# Patient Record
Sex: FEMALE | Race: OTHER
[De-identification: names, ages, dates, MRNs, and addresses within clinical notes are randomized per-mention and may not be internally consistent; named-entity substitution may affect disease eponyms.]

---

## 2018-07-10 ENCOUNTER — HOSPITAL ENCOUNTER (EMERGENCY)
Dept: HOSPITAL 62 - ER | Age: 28
Discharge: HOME | End: 2018-07-10
Payer: COMMERCIAL

## 2018-07-10 VITALS — DIASTOLIC BLOOD PRESSURE: 57 MMHG | SYSTOLIC BLOOD PRESSURE: 126 MMHG

## 2018-07-10 DIAGNOSIS — S16.1XXA: Primary | ICD-10-CM

## 2018-07-10 DIAGNOSIS — M54.89: ICD-10-CM

## 2018-07-10 DIAGNOSIS — S29.012A: ICD-10-CM

## 2018-07-10 DIAGNOSIS — M54.2: ICD-10-CM
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
## 2018-07-10 PROCEDURE — 99284 EMERGENCY DEPT VISIT MOD MDM: CPT

## 2018-07-10 PROCEDURE — 72125 CT NECK SPINE W/O DYE: CPT

## 2018-07-10 PROCEDURE — 72070 X-RAY EXAM THORAC SPINE 2VWS: CPT
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
## 2018-08-21 ENCOUNTER — HOSPITAL ENCOUNTER (EMERGENCY)
Dept: HOSPITAL 62 - ER | Age: 28
LOS: 1 days | Discharge: HOME | End: 2018-08-22
Payer: COMMERCIAL

## 2018-08-21 DIAGNOSIS — Z3A.01: ICD-10-CM

## 2018-08-21 DIAGNOSIS — O21.9: ICD-10-CM

## 2018-08-21 DIAGNOSIS — O23.41: Primary | ICD-10-CM

## 2018-08-21 DIAGNOSIS — Z87.891: ICD-10-CM

## 2018-08-21 LAB
ADD MANUAL DIFF: NO
ALBUMIN SERPL-MCNC: 4.6 G/DL (ref 3.5–5)
ALP SERPL-CCNC: 87 U/L (ref 38–126)
ALT SERPL-CCNC: 24 U/L (ref 9–52)
ANION GAP SERPL CALC-SCNC: 15 MMOL/L (ref 5–19)
APPEARANCE UR: (no result)
APTT PPP: (no result) S
AST SERPL-CCNC: 23 U/L (ref 14–36)
BASOPHILS # BLD AUTO: 0 10^3/UL (ref 0–0.2)
BASOPHILS NFR BLD AUTO: 0.2 % (ref 0–2)
BILIRUB DIRECT SERPL-MCNC: 0.4 MG/DL (ref 0–0.4)
BILIRUB SERPL-MCNC: 0.8 MG/DL (ref 0.2–1.3)
BILIRUB UR QL STRIP: NEGATIVE
BUN SERPL-MCNC: 12 MG/DL (ref 7–20)
CALCIUM: 9.6 MG/DL (ref 8.4–10.2)
CHLORIDE SERPL-SCNC: 103 MMOL/L (ref 98–107)
CO2 SERPL-SCNC: 22 MMOL/L (ref 22–30)
EOSINOPHIL # BLD AUTO: 0.1 10^3/UL (ref 0–0.6)
EOSINOPHIL NFR BLD AUTO: 0.7 % (ref 0–6)
ERYTHROCYTE [DISTWIDTH] IN BLOOD BY AUTOMATED COUNT: 13.8 % (ref 11.5–14)
GLUCOSE SERPL-MCNC: 84 MG/DL (ref 75–110)
GLUCOSE UR STRIP-MCNC: NEGATIVE MG/DL
HCT VFR BLD CALC: 44.4 % (ref 36–47)
HGB BLD-MCNC: 15.4 G/DL (ref 12–15.5)
KETONES UR STRIP-MCNC: 80 MG/DL
LIPASE SERPL-CCNC: 72.2 U/L (ref 23–300)
LYMPHOCYTES # BLD AUTO: 1.7 10^3/UL (ref 0.5–4.7)
LYMPHOCYTES NFR BLD AUTO: 11.8 % (ref 13–45)
MCH RBC QN AUTO: 29.1 PG (ref 27–33.4)
MCHC RBC AUTO-ENTMCNC: 34.7 G/DL (ref 32–36)
MCV RBC AUTO: 84 FL (ref 80–97)
MONOCYTES # BLD AUTO: 0.8 10^3/UL (ref 0.1–1.4)
MONOCYTES NFR BLD AUTO: 5.7 % (ref 3–13)
NEUTROPHILS # BLD AUTO: 11.7 10^3/UL (ref 1.7–8.2)
NEUTS SEG NFR BLD AUTO: 81.6 % (ref 42–78)
NITRITE UR QL STRIP: NEGATIVE
PH UR STRIP: 6 [PH] (ref 5–9)
PLATELET # BLD: 233 10^3/UL (ref 150–450)
POTASSIUM SERPL-SCNC: 4.5 MMOL/L (ref 3.6–5)
PROT SERPL-MCNC: 8.5 G/DL (ref 6.3–8.2)
PROT UR STRIP-MCNC: NEGATIVE MG/DL
RBC # BLD AUTO: 5.28 10^6/UL (ref 3.72–5.28)
SODIUM SERPL-SCNC: 139.9 MMOL/L (ref 137–145)
SP GR UR STRIP: 1.02
TOTAL CELLS COUNTED % (AUTO): 100 %
UROBILINOGEN UR-MCNC: 4 MG/DL (ref ?–2)
WBC # BLD AUTO: 14.3 10^3/UL (ref 4–10.5)

## 2018-08-21 PROCEDURE — 84702 CHORIONIC GONADOTROPIN TEST: CPT

## 2018-08-21 PROCEDURE — S0119 ONDANSETRON 4 MG: HCPCS

## 2018-08-21 PROCEDURE — 80053 COMPREHEN METABOLIC PANEL: CPT

## 2018-08-21 PROCEDURE — 99284 EMERGENCY DEPT VISIT MOD MDM: CPT

## 2018-08-21 PROCEDURE — 96375 TX/PRO/DX INJ NEW DRUG ADDON: CPT

## 2018-08-21 PROCEDURE — 96374 THER/PROPH/DIAG INJ IV PUSH: CPT

## 2018-08-21 PROCEDURE — 81001 URINALYSIS AUTO W/SCOPE: CPT

## 2018-08-21 PROCEDURE — 96361 HYDRATE IV INFUSION ADD-ON: CPT

## 2018-08-21 PROCEDURE — 85025 COMPLETE CBC W/AUTO DIFF WBC: CPT

## 2018-08-21 PROCEDURE — 36415 COLL VENOUS BLD VENIPUNCTURE: CPT

## 2018-08-21 PROCEDURE — 83690 ASSAY OF LIPASE: CPT

## 2018-08-21 NOTE — ER DOCUMENT REPORT
ED General





- General


Chief Complaint: Abdominal Pain


Stated Complaint: VOMITING


Time Seen by Provider: 18 16:01


Mode of Arrival: Ambulatory


Information source: Patient, [facility] Records


Notes: 





28-year-old female with history of hyperemesis gravidarum presents with 

complaint of nausea, vomiting and inability to tolerate food or fluid that has 

been ongoing for approximately 2 weeks.  Patient is  at approximately 7 

weeks and 3 days by last menstrual period which was approximately 2018.

  She states that her previous pregnancy she suffered from hyperemesis and was 

hospitalized multiple times.  She states over the last few days she has been 

intolerable of fluids which prompted her to come in.  She denies any abdominal 

pain, vaginal bleeding.  She has not yet seen OB for this pregnancy.  She 

denies any recent illness, fever, chills, diarrhea.  She has had urinary 

frequency but denies dysuria.


TRAVEL OUTSIDE OF THE [place] IN LAST 30 DAYS: No





- HPI


Onset: Last week


Onset/Duration: Gradual, Persistent, Worse


Quality of pain: No pain


Severity: Moderate


Associated symptoms: Nausea, Vomiting


Exacerbated by: Food


Relieved by: Denies


Similar symptoms previously: Yes


Recently seen / treated by doctor: No





- Related Data


Allergies/Adverse Reactions: 


 





No Known Allergies Allergy (Verified 07/10/18 17:34)


 











Past Medical History





- General


Information source: Patient, [facility] Records





- Social History


Smoking Status: Former Smoker


Chew tobacco use (# tins/day): No


Frequency of alcohol use: None


Drug Abuse: None


Lives with: Family


Family History: Reviewed & Not Pertinent


Patient has suicidal ideation: No


Patient has homicidal ideation: No





- Medical History


Medical History: Negative


Renal/ Medical History: Denies: Hx Peritoneal Dialysis





- Immunizations


Hx Diphtheria, Pertussis, Tetanus Vaccination: Yes





Review of Systems





- Review of Systems


Notes: 





REVIEW OF SYSTEMS:


CONSTITUTIONAL :  Denies fever,  chills, or sweats.  Denies recent illness. 

Denies weight loss, recent hospitalizations. 


EENT: Denies visual changes, eye pain.    Denies nasal or sinus congestion or 

discharge.  Denies sore throat, oral lesions, difficulty swallowing.


CARDIOVASCULAR:  Denies chest pain.  Denies palpitations. Denies lower 

extremity edema.


RESPIRATORY:  Denies cough, cold, or chest congestion.  Denies shortness of 

breath, wheezing.


GASTROINTESTINAL:  Denies abdominal pain or distention.  Denies  diarrhea.  

Denies blood in vomitus, stools, or per rectum.  Denies black, tarry stools.  

Denies constipation.  


GENITOURINARY:  Denies difficulty urinating, painful urination,  frequency, 

blood in urine, or  vaginal discharge, vaginal bleeding.


MUSCULOSKELETAL:  Denies back or neck pain or stiffness.  Denies joint pain or 

swelling.


SKIN:   Denies rash, lesions or sores.


HEMATOLOGIC :   Denies easy bruising or bleeding.


LYMPHATIC:  Denies swollen glands.


NEUROLOGICAL:  Denies confusion or altered mental status.  Denies passing out 

or loss of consciousness.  Denies dizziness or lightheadedness.  Denies 

headache.  Denies weakness or paralysis.  Denies problems difficulty with 

ambulation, slurred speech.  Denies sensory loss, numbness, or tingling.  

Denies seizures.


PSYCHIATRIC:  Denies anxiety or stress.  Denies depression, suicidal ideation, 

or homicidal ideation.  Denies visual or auditory hallucinations.








Physical Exam





- Vital signs


Vitals: 


 











Temp Pulse Resp BP Pulse Ox


 


 98.7 F   92   20   120/65   99 


 


 18 15:02  18 15:02  18 15:02  18 15:02  18 15:02











Interpretation: Normal.  No: Tachycardic, Febrile





- Notes


Notes: 





PHYSICAL EXAMINATION:





GENERAL: Actively vomiting





HEAD: Atraumatic, normocephalic.





EYES: Pupils equal round and reactive to light, extraocular movements intact, 

conjunctiva are normal.





ENT: Nares patent, oropharynx clear without exudates.  Moist mucous membranes.





NECK: Normal range of motion, supple without lymphadenopathy





LUNGS: Breath sounds clear to auscultation bilaterally and equal.  No wheezes 

rales or rhonchi.





HEART: Regular rate and rhythm without murmurs





ABDOMEN: Soft, nontender, nondistended abdomen.  No guarding, no rebound.  No 

masses appreciated.





Female : deferred





Musculoskeletal: Normal range of motion, no pitting or edema.  No cyanosis.





NEUROLOGICAL: Cranial nerves grossly intact.  Normal speech, normal gait.  

Normal sensory, motor exams





PSYCH: Normal mood, normal affect.





SKIN: Warm, Dry, normal turgor, no rashes or lesions noted.





Course





- Re-evaluation


Re-evalutation: 








Laboratory











  18





  16:07 16:18 16:18


 


WBC   14.3 H 


 


RBC   5.28 


 


Hgb   15.4 


 


Hct   44.4 


 


MCV   84 


 


MCH   29.1 


 


MCHC   34.7 


 


RDW   13.8 


 


Plt Count   233 


 


Seg Neutrophils %   81.6 H 


 


Lymphocytes %   11.8 L 


 


Monocytes %   5.7 


 


Eosinophils %   0.7 


 


Basophils %   0.2 


 


Absolute Neutrophils   11.7 H 


 


Absolute Lymphocytes   1.7 


 


Absolute Monocytes   0.8 


 


Absolute Eosinophils   0.1 


 


Absolute Basophils   0.0 


 


Sodium    139.9


 


Potassium    4.5


 


Chloride    103


 


Carbon Dioxide    22


 


Anion Gap    15


 


BUN    12


 


Creatinine    0.61


 


Est GFR ( Amer)    > 60


 


Est GFR (Non-Af Amer)    > 60


 


Glucose    84


 


Calcium    9.6


 


Total Bilirubin    0.8


 


Direct Bilirubin    0.4


 


Neonat Total Bilirubin    Not Reportable


 


Neonat Direct Bilirubin    Not Reportable


 


Neonat Indirect Bili    Not Reportable


 


AST    23


 


ALT    24


 


Alkaline Phosphatase    87


 


Total Protein    8.5 H


 


Albumin    4.6


 


Lipase    72.2


 


Beta HCG, Quant    40830.00 H


 


Total Beta HCG    POSITIVE


 


Urine Color  HARMEET  


 


Urine Appearance  CLOUDY  


 


Urine pH  6.0  


 


Ur Specific Gravity  1.019  


 


Urine Protein  NEGATIVE  


 


Urine Glucose (UA)  NEGATIVE  


 


Urine Ketones  80 H  


 


Urine Blood  NEGATIVE  


 


Urine Nitrite  NEGATIVE  


 


Urine Bilirubin  NEGATIVE  


 


Urine Urobilinogen  4.0 H  


 


Ur Leukocyte Esterase  LARGE H  


 


Urine WBC (Auto)  56  


 


Urine RBC (Auto)  10  


 


Urine Bacteria (Auto)  TRACE  


 


Squamous Epi Cells Auto  25  


 


Urine Mucus (Auto)  OCC  


 


Urine Ascorbic Acid  NEGATIVE  











18 22:06


28-year-old female with history of hyperemesis gravidarum presents with 

complaint of nausea, vomiting and inability to tolerate food or fluid that has 

been ongoing for approximately 2 weeks.  Patient is  at approximately 7 

weeks and 3 days by last menstrual period which was approximately 2018.

  She states that her previous pregnancy she suffered from hyperemesis and was 

hospitalized multiple times.  She states over the last few days she has been 

intolerable of fluids which prompted her to come in.  She denies any abdominal 

pain, vaginal bleeding.  She has not yet seen OB for this pregnancy.  She 

denies any recent illness, fever, chills, diarrhea.  She has had urinary 

frequency but denies dysuria.  Patient was seen by myself upon arrival.  Vital 

signs were reviewed. Patient is afebrile, normotensive and not hypoxic.  

Patient does not appear toxic or dehydrated.  They are in no acute distress.  

Previous medical records and nursing notes reviewed.  Significant findings 

include urinalysis consistent with UTI.  Bedside ultrasound was performed to 

assess fetal heart tones which were 170.  She was given 1 L of lactated Ringer's

, 2 L of normal saline, Zofran, Phenergan and Benadryl during her ED course.  

On reevaluation she states her nausea has improved.  She is tolerating fluids.  

She was encouraged to make an appointment with OB since she is approaching her 

8 week laverne.  Ultrasound images attached to chart.  Patient will be going home 

with prescription for Phenergan and Macrobid.  Patient provided the opportunity 

to ask questions, and express concerns.  Discharge instructions discussed. 

Patient is agreeable with discharge home.  Return indications explained and 

discussed with the patient who displays understanding.  Patient encouraged to 

return to the emergency department immediately with any concerns.


18 23:11


Upon walking into the emergency department patient did not even make it to the 

waiting room before she began vomiting again.  Zofran was administered.  I did 

put a call out to Dr. Mckeon to see if she has any further recommendations.


18 00:05


I did speak to Dr. Mckeon regarding home-going medications for the patient.  

She recommends Zantac twice daily and likely just.  She is willing to admit the 

patient for hyperemesis.  Patient is requesting a few more minutes to see if 

repeat Zofran dose has worked.


18 00:14


Patient offered admission.  She is declining at this time.  She will be 

discharged home with Zantac and likely just as recommended by Dr. Mckeon.





- Vital Signs


Vital signs: 


 











Temp Pulse Resp BP Pulse Ox


 


 99.0 F   75   18   112/79   100 


 


 18 00:26  18 00:26  18 00:26  18 00:26  18 00:26














- Laboratory


Result Diagrams: 


 18 16:18





 18 16:18


Laboratory results interpreted by me: 


 











  18





  16:07 16:18 16:18


 


WBC   14.3 H 


 


Seg Neutrophils %   81.6 H 


 


Lymphocytes %   11.8 L 


 


Absolute Neutrophils   11.7 H 


 


Total Protein    8.5 H


 


Beta HCG, Quant    67534.00 H


 


Urine Ketones  80 H  


 


Urine Urobilinogen  4.0 H  


 


Ur Leukocyte Esterase  LARGE H  














Procedures





- Ultrasound/Bedside


  ** Ultrasound/Bedside


Time completed: 19:00 - Bedside ultrasound to assess for fetal heart tones was 

performed.  Fetal heart rate 170.  Gestational sac seen in the uterus with yolk 

sac and fetus.





Discharge





- Discharge


Clinical Impression: 


 Hyperemesis arising during pregnancy, First trimester pregnancy





Urinary tract infection


Qualifiers:


 Urinary tract infection type: site unspecified Hematuria presence: without 

hematuria Qualified Code(s): N39.0 - Urinary tract infection, site not specified





Condition: Good


Disposition: HOME, SELF-CARE


Instructions:  Hyperemesis Gravidarum ([facility]), Urinary Tract Infection ([facility])


Additional Instructions: 


Please make an appointment with OB/GYN or the health department for prenatal 

care.  B6 supplements and imer tea are also helpful for nausea and vomiting 

during pregnancy.


Prescriptions: 


Doxylamine Succinate/Vit B6 [Diclegis Dr 10-10 mg Tablet] 1 each PO ASDIR PRN #

30 tablet.


 PRN Reason: Nausea vomiting


Nitrofurantoin Monohyd/M-Cryst [Macrobid 100 mg Capsule] 1 tab PO BID #20 

capsule


Promethazine HCl [Phenergan 25 mg Tablet] 25 mg PO Q8H #14 tablet


Ranitidine HCl [Zantac 150 mg Tablet] 150 mg PO BID #60 tablet


Forms:  Return to Work

## 2018-08-21 NOTE — ER DOCUMENT REPORT
ED Medical Screen (RME)





- General


Chief Complaint: Abdominal Pain


Stated Complaint: VOMITING


Time Seen by Provider: 18 16:01


TRAVEL OUTSIDE OF THE [place] IN LAST 30 DAYS: No





- HPI


Notes: 





18 16:05


 coming in today for nausea vomiting states last pregnancy diagnosis 

hyperemesis gravidarum.  Patient states she did not tolerate Zofran during her 

pregnancy.  Denies any other complications.  Denies any other medical issues.





- Related Data


Allergies/Adverse Reactions: 


 





No Known Allergies Allergy (Verified 07/10/18 17:34)


 











Past Medical History





- Social History


Chew tobacco use (# tins/day): No


Frequency of alcohol use: None


Drug Abuse: None


Renal/ Medical History: Denies: Hx Peritoneal Dialysis





- Immunizations


Hx Diphtheria, Pertussis, Tetanus Vaccination: Yes





Review of Systems





- Review of Systems


Gastrointestinal: Nausea, Vomiting


-: Yes All other systems reviewed and negative





Physical Exam





- Vital signs


Vitals: 





 











Temp Pulse Resp BP Pulse Ox


 


 98.7 F   92   20   120/65   99 


 


 18 15:02  18 15:02  18 15:02  18 15:02  18 15:02














- Respiratory


Respiratory status: No respiratory distress


Chest status: Nontender


Breath sounds: Normal


Chest palpation: Normal





Course





- Vital Signs


Vital signs: 





 











Temp Pulse Resp BP Pulse Ox


 


 98.7 F   92   20   120/65   99 


 


 18 15:02  18 15:02  18 15:02  18 15:02  18 15:02

## 2018-08-22 VITALS — DIASTOLIC BLOOD PRESSURE: 79 MMHG | SYSTOLIC BLOOD PRESSURE: 112 MMHG
